# Patient Record
Sex: MALE | Race: WHITE | NOT HISPANIC OR LATINO | Employment: OTHER | URBAN - METROPOLITAN AREA
[De-identification: names, ages, dates, MRNs, and addresses within clinical notes are randomized per-mention and may not be internally consistent; named-entity substitution may affect disease eponyms.]

---

## 2018-12-18 ENCOUNTER — OFFICE VISIT (OUTPATIENT)
Dept: URGENT CARE | Facility: CLINIC | Age: 58
End: 2018-12-18
Payer: COMMERCIAL

## 2018-12-18 VITALS
OXYGEN SATURATION: 96 % | SYSTOLIC BLOOD PRESSURE: 136 MMHG | DIASTOLIC BLOOD PRESSURE: 95 MMHG | BODY MASS INDEX: 26.52 KG/M2 | TEMPERATURE: 98 F | HEIGHT: 66 IN | WEIGHT: 165 LBS | HEART RATE: 86 BPM | RESPIRATION RATE: 20 BRPM

## 2018-12-18 DIAGNOSIS — J20.8 ACUTE BACTERIAL BRONCHITIS: Primary | ICD-10-CM

## 2018-12-18 DIAGNOSIS — B96.89 ACUTE BACTERIAL BRONCHITIS: Primary | ICD-10-CM

## 2018-12-18 PROCEDURE — 3008F BODY MASS INDEX DOCD: CPT | Mod: CPTII,S$GLB,, | Performed by: PHYSICIAN ASSISTANT

## 2018-12-18 PROCEDURE — 96372 THER/PROPH/DIAG INJ SC/IM: CPT | Mod: S$GLB,,, | Performed by: EMERGENCY MEDICINE

## 2018-12-18 PROCEDURE — 99203 OFFICE O/P NEW LOW 30 MIN: CPT | Mod: 25,S$GLB,, | Performed by: PHYSICIAN ASSISTANT

## 2018-12-18 RX ORDER — GLIMEPIRIDE 2 MG/1
TABLET ORAL
Refills: 2 | COMMUNITY
Start: 2018-12-11

## 2018-12-18 RX ORDER — CODEINE PHOSPHATE AND GUAIFENESIN 10; 100 MG/5ML; MG/5ML
5 SOLUTION ORAL 3 TIMES DAILY PRN
Qty: 120 ML | Refills: 0 | Status: SHIPPED | OUTPATIENT
Start: 2018-12-18 | End: 2018-12-28

## 2018-12-18 RX ORDER — CANAGLIFLOZIN 300 MG/1
TABLET, FILM COATED ORAL
Refills: 4 | COMMUNITY
Start: 2018-12-11

## 2018-12-18 RX ORDER — BISOPROLOL FUMARATE AND HYDROCHLOROTHIAZIDE 5; 6.25 MG/1; MG/1
TABLET ORAL
Refills: 2 | COMMUNITY
Start: 2018-12-11

## 2018-12-18 RX ORDER — ALPRAZOLAM 0.5 MG/1
TABLET ORAL
Refills: 0 | COMMUNITY
Start: 2018-10-23

## 2018-12-18 RX ORDER — VALACYCLOVIR HYDROCHLORIDE 1 G/1
TABLET, FILM COATED ORAL
Refills: 0 | COMMUNITY
Start: 2018-11-08

## 2018-12-18 RX ORDER — BETAMETHASONE SODIUM PHOSPHATE AND BETAMETHASONE ACETATE 3; 3 MG/ML; MG/ML
6 INJECTION, SUSPENSION INTRA-ARTICULAR; INTRALESIONAL; INTRAMUSCULAR; SOFT TISSUE
Status: COMPLETED | OUTPATIENT
Start: 2018-12-18 | End: 2018-12-18

## 2018-12-18 RX ORDER — TAMSULOSIN HYDROCHLORIDE 0.4 MG/1
CAPSULE ORAL
Refills: 2 | COMMUNITY
Start: 2018-12-11

## 2018-12-18 RX ORDER — FENOFIBRATE 160 MG/1
TABLET ORAL
Refills: 4 | COMMUNITY
Start: 2018-12-11

## 2018-12-18 RX ORDER — AZITHROMYCIN 250 MG/1
TABLET, FILM COATED ORAL
Qty: 6 TABLET | Refills: 0 | Status: SHIPPED | OUTPATIENT
Start: 2018-12-18

## 2018-12-18 RX ADMIN — BETAMETHASONE SODIUM PHOSPHATE AND BETAMETHASONE ACETATE 6 MG: 3; 3 INJECTION, SUSPENSION INTRA-ARTICULAR; INTRALESIONAL; INTRAMUSCULAR; SOFT TISSUE at 11:12

## 2018-12-18 NOTE — PATIENT INSTRUCTIONS
Bronchitis, Antibiotic Treatment (Adult)    Bronchitis is an infection of the air passages (bronchial tubes) in your lungs. It often occurs when you have a cold. This illness is contagious during the first few days and is spread through the air by coughing and sneezing, or by direct contact (touching the sick person and then touching your own eyes, nose, or mouth).  Symptoms of bronchitis include cough with mucus (phlegm) and low-grade fever. Bronchitis usually lasts 7 to 14 days. Mild cases can be treated with simple home remedies. More severe infection is treated with an antibiotic.  Home care  Follow these guidelines when caring for yourself at home:  · If your symptoms are severe, rest at home for the first 2 to 3 days. When you go back to your usual activities, don't let yourself get too tired.  · Do not smoke. Also avoid being exposed to secondhand smoke.  · You may use over-the-counter medicines to control fever or pain, unless another medicine was prescribed. (Note: If you have chronic liver or kidney disease or have ever had a stomach ulcer or gastrointestinal bleeding, talk with your healthcare provider before using these medicines. Also talk to your provider if you are taking medicine to prevent blood clots.) Aspirin should never be given to anyone younger than 18 years of age who is ill with a viral infection or fever. It may cause severe liver or brain damage.  · Your appetite may be poor, so a light diet is fine. Avoid dehydration by drinking 6 to 8 glasses of fluids per day (such as water, soft drinks, sports drinks, juices, tea, or soup). Extra fluids will help loosen secretions in the nose and lungs.  · Over-the-counter cough, cold, and sore-throat medicines will not shorten the length of the illness, but they may be helpful to reduce symptoms. (Note: Do not use decongestants if you have high blood pressure.)  · Finish all antibiotic medicine. Do this even if you are feeling better after only a  few days.  Follow-up care  Follow up with your healthcare provider, or as advised. If you had an X-ray or ECG (electrocardiogram), a specialist will review it. You will be notified of any new findings that may affect your care.  Note: If you are age 65 or older, or if you have a chronic lung disease or condition that affects your immune system, or you smoke, talk to your healthcare provider about having pneumococcal vaccinations and a yearly influenza vaccination (flu shot).  When to seek medical advice  Call your healthcare provider right away if any of these occur:  · Fever of 100.4°F (38°C) or higher  · Coughing up increased amounts of colored sputum  · Weakness, drowsiness, headache, facial pain, ear pain, or a stiff neck  Call 911, or get immediate medical care  Contact emergency services right away if any of these occur.  · Coughing up blood  · Worsening weakness, drowsiness, headache, or stiff neck  · Trouble breathing, wheezing, or pain with breathing  Date Last Reviewed: 9/13/2015 © 2000-2017 [employer]. [address] [place] [address]. All rights reserved. This information is not intended as a substitute for professional medical care. Always follow your healthcare professional's instructions.      Please follow up with your Primary care provider within 2-5 days if your signs and symptoms have not resolved or worsen.     If your condition worsens or fails to improve we recommend that you receive another evaluation at the emergency room immediately or contact your primary medical clinic to discuss your concerns.   You must understand that you have received an Urgent Care treatment only and that you may be released before all of your medical problems are known or treated. You, the patient, will arrange for follow up care as instructed.     RED FLAGS/WARNING SYMPTOMS DISCUSSED WITH PATIENT THAT WOULD WARRANT EMERGENT MEDICAL ATTENTION. PATIENT VERBALIZED UNDERSTANDING.     Elevated  Blood Pressure  Your blood pressure was elevated during your visit to the urgent care.  It was not so high that immediate care was needed but it is recommended that you monitor your blood pressure over the next week or two to make sure that it is not staying elevated.  Please have your blood pressure taken 2-3 times daily at different times of the day.  Write all of those blood pressures down and record the time that they were taken.  Keep all that information and take it with you to see your Primary Care Physician.  If your blood pressure is consistently above 140/90 you will need to follow up with your PCP more quickly  You were given a steroid injection today. Risks and benefits were discussed with you.   If you have diabetes this injection will raise your blood sugar. This will normalize over the next 2-3 days. Please make sure you monitor you blood sugar accordingly.   This medication can also increase you blood pressure. Please monitor your blood pressure as discussed.   Please keep in mind that you should not take long term steroids unless prescribed by your physician. You should not exceed 4 steroid injections in a year and if you have multiple injections they should be spaced out adequately. Long term side effects and risks can occur as mentioned at todays visit.

## 2018-12-18 NOTE — PROGRESS NOTES
"Subjective:       Patient ID: Arturo Peña is a 58 y.o. male.    Vitals:  height is 5' 6" (1.676 m) and weight is 74.8 kg (165 lb). His oral temperature is 97.8 °F (36.6 °C). His blood pressure is 136/95 (abnormal) and his pulse is 86. His respiration is 20 and oxygen saturation is 96%.     Chief Complaint: Cough    This is a 58 y.o. male who presents today with a chief complaint of URI for 3 weeks.  Pt has sinus pain/pressure, post nasal drip, sore throat, and a cough.  Pt has taken Mucinex, Dayquil, and Nyquil without relief.        Cough   This is a new problem. The current episode started 1 to 4 weeks ago (3 weeks ago). The problem has been waxing and waning. The problem occurs every few minutes. The cough is productive of sputum. Associated symptoms include postnasal drip and a sore throat. Pertinent negatives include no chills, ear pain, eye redness, fever, hemoptysis, myalgias, rash, shortness of breath or wheezing. Treatments tried: Mucinex, Dayquil, Nyquil. The treatment provided no relief.       Constitution: Negative for chills, sweating, fatigue and fever.   HENT: Positive for congestion, postnasal drip, sinus pain, sinus pressure and sore throat. Negative for ear pain and voice change.    Neck: Negative for painful lymph nodes.   Eyes: Negative for eye redness.   Respiratory: Positive for cough. Negative for chest tightness, sputum production, bloody sputum, COPD, shortness of breath, stridor, wheezing and asthma.    Gastrointestinal: Negative for nausea and vomiting.   Musculoskeletal: Negative for muscle ache.   Skin: Negative for rash.   Allergic/Immunologic: Negative for seasonal allergies and asthma.   Hematologic/Lymphatic: Negative for swollen lymph nodes.       Objective:      Physical Exam   Constitutional: He is oriented to person, place, and time. He appears well-developed and well-nourished. He is cooperative.  Non-toxic appearance. He does not appear ill. No distress.   HENT:   Head: " Normocephalic and atraumatic.   Right Ear: Hearing, tympanic membrane, external ear and ear canal normal.   Left Ear: Hearing, tympanic membrane, external ear and ear canal normal.   Nose: Rhinorrhea present. No mucosal edema or nasal deformity. No epistaxis. Right sinus exhibits no maxillary sinus tenderness and no frontal sinus tenderness. Left sinus exhibits no maxillary sinus tenderness and no frontal sinus tenderness.   Mouth/Throat: Uvula is midline and mucous membranes are normal. No trismus in the jaw. Normal dentition. No uvula swelling. Posterior oropharyngeal erythema present. Tonsils are 1+ on the right. Tonsils are 1+ on the left. No tonsillar exudate.   Eyes: Conjunctivae and lids are normal. No scleral icterus.   Sclera clear bilat   Neck: Trachea normal, full passive range of motion without pain and phonation normal. Neck supple.   Cardiovascular: Normal rate, regular rhythm, normal heart sounds, intact distal pulses and normal pulses.   Pulmonary/Chest: Effort normal. No accessory muscle usage or stridor. No respiratory distress. He has no decreased breath sounds. He has no wheezes. He has no rhonchi. He has no rales.   Moderate upper airway conegstion   Abdominal: Soft. Normal appearance and bowel sounds are normal. He exhibits no distension. There is no tenderness.   Musculoskeletal: Normal range of motion. He exhibits no edema or deformity.   Neurological: He is alert and oriented to person, place, and time. He exhibits normal muscle tone. Coordination normal.   Skin: Skin is warm, dry and intact. He is not diaphoretic. No pallor.   Psychiatric: He has a normal mood and affect. His speech is normal and behavior is normal. Judgment and thought content normal. Cognition and memory are normal.   Nursing note and vitals reviewed.      Assessment:       1. Acute bacterial bronchitis        Plan:         Acute bacterial bronchitis  -     betamethasone acetate-betamethasone sodium phosphate injection 6  mg  -     azithromycin (ZITHROMAX Z-LISBETH) 250 MG tablet; Take 2 tablets (500 mg) on  Day 1,  followed by 1 tablet (250 mg) once daily on Days 2 through 5.  Dispense: 6 tablet; Refill: 0  -     guaifenesin-codeine 100-10 mg/5 ml (TUSSI-ORGANIDIN NR)  mg/5 mL syrup; Take 5 mLs by mouth 3 (three) times daily as needed for Cough.  Dispense: 120 mL; Refill: 0          Bronchitis, Antibiotic Treatment (Adult)    Bronchitis is an infection of the air passages (bronchial tubes) in your lungs. It often occurs when you have a cold. This illness is contagious during the first few days and is spread through the air by coughing and sneezing, or by direct contact (touching the sick person and then touching your own eyes, nose, or mouth).  Symptoms of bronchitis include cough with mucus (phlegm) and low-grade fever. Bronchitis usually lasts 7 to 14 days. Mild cases can be treated with simple home remedies. More severe infection is treated with an antibiotic.  Home care  Follow these guidelines when caring for yourself at home:  · If your symptoms are severe, rest at home for the first 2 to 3 days. When you go back to your usual activities, don't let yourself get too tired.  · Do not smoke. Also avoid being exposed to secondhand smoke.  · You may use over-the-counter medicines to control fever or pain, unless another medicine was prescribed. (Note: If you have chronic liver or kidney disease or have ever had a stomach ulcer or gastrointestinal bleeding, talk with your healthcare provider before using these medicines. Also talk to your provider if you are taking medicine to prevent blood clots.) Aspirin should never be given to anyone younger than 18 years of age who is ill with a viral infection or fever. It may cause severe liver or brain damage.  · Your appetite may be poor, so a light diet is fine. Avoid dehydration by drinking 6 to 8 glasses of fluids per day (such as water, soft drinks, sports drinks, juices, tea, or  soup). Extra fluids will help loosen secretions in the nose and lungs.  · Over-the-counter cough, cold, and sore-throat medicines will not shorten the length of the illness, but they may be helpful to reduce symptoms. (Note: Do not use decongestants if you have high blood pressure.)  · Finish all antibiotic medicine. Do this even if you are feeling better after only a few days.  Follow-up care  Follow up with your healthcare provider, or as advised. If you had an X-ray or ECG (electrocardiogram), a specialist will review it. You will be notified of any new findings that may affect your care.  Note: If you are age 65 or older, or if you have a chronic lung disease or condition that affects your immune system, or you smoke, talk to your healthcare provider about having pneumococcal vaccinations and a yearly influenza vaccination (flu shot).  When to seek medical advice  Call your healthcare provider right away if any of these occur:  · Fever of 100.4°F (38°C) or higher  · Coughing up increased amounts of colored sputum  · Weakness, drowsiness, headache, facial pain, ear pain, or a stiff neck  Call 911, or get immediate medical care  Contact emergency services right away if any of these occur.  · Coughing up blood  · Worsening weakness, drowsiness, headache, or stiff neck  · Trouble breathing, wheezing, or pain with breathing  Date Last Reviewed: 9/13/2015  © 0250-0874 [employer]. [address] [place] [address]. All rights reserved. This information is not intended as a substitute for professional medical care. Always follow your healthcare professional's instructions.      Please follow up with your Primary care provider within 2-5 days if your signs and symptoms have not resolved or worsen.     If your condition worsens or fails to improve we recommend that you receive another evaluation at the emergency room immediately or contact your primary medical clinic to discuss your concerns.   You  must understand that you have received an Urgent Care treatment only and that you may be released before all of your medical problems are known or treated. You, the patient, will arrange for follow up care as instructed.     RED FLAGS/WARNING SYMPTOMS DISCUSSED WITH PATIENT THAT WOULD WARRANT EMERGENT MEDICAL ATTENTION. PATIENT VERBALIZED UNDERSTANDING.     Elevated Blood Pressure  Your blood pressure was elevated during your visit to the urgent care.  It was not so high that immediate care was needed but it is recommended that you monitor your blood pressure over the next week or two to make sure that it is not staying elevated.  Please have your blood pressure taken 2-3 times daily at different times of the day.  Write all of those blood pressures down and record the time that they were taken.  Keep all that information and take it with you to see your Primary Care Physician.  If your blood pressure is consistently above 140/90 you will need to follow up with your PCP more quickly  You were given a steroid injection today. Risks and benefits were discussed with you.   If you have diabetes this injection will raise your blood sugar. This will normalize over the next 2-3 days. Please make sure you monitor you blood sugar accordingly.   This medication can also increase you blood pressure. Please monitor your blood pressure as discussed.   Please keep in mind that you should not take long term steroids unless prescribed by your physician. You should not exceed 4 steroid injections in a year and if you have multiple injections they should be spaced out adequately. Long term side effects and risks can occur as mentioned at todays visit.

## 2025-03-21 ENCOUNTER — OFFICE VISIT (OUTPATIENT)
Dept: URGENT CARE | Facility: CLINIC | Age: 65
End: 2025-03-21
Payer: MEDICARE

## 2025-03-21 VITALS
OXYGEN SATURATION: 96 % | WEIGHT: 165 LBS | BODY MASS INDEX: 26.52 KG/M2 | RESPIRATION RATE: 17 BRPM | SYSTOLIC BLOOD PRESSURE: 159 MMHG | DIASTOLIC BLOOD PRESSURE: 87 MMHG | HEIGHT: 66 IN | TEMPERATURE: 98 F | HEART RATE: 70 BPM

## 2025-03-21 DIAGNOSIS — S05.02XA ABRASION OF LEFT CORNEA, INITIAL ENCOUNTER: Primary | ICD-10-CM

## 2025-03-21 RX ORDER — ERYTHROMYCIN 5 MG/G
OINTMENT OPHTHALMIC 4 TIMES DAILY
Qty: 3.5 G | Refills: 0 | Status: SHIPPED | OUTPATIENT
Start: 2025-03-21 | End: 2025-03-28

## 2025-03-21 RX ORDER — EMPAGLIFLOZIN 25 MG/1
25 TABLET, FILM COATED ORAL
COMMUNITY

## 2025-03-21 RX ORDER — SODIUM CHLORIDE 1 G/1
2000 TABLET ORAL 3 TIMES DAILY
COMMUNITY

## 2025-03-21 RX ORDER — SODIUM BICARBONATE 650 MG/1
1300 TABLET ORAL 3 TIMES DAILY
COMMUNITY

## 2025-03-21 RX ORDER — ROSUVASTATIN CALCIUM 10 MG/1
10 TABLET, COATED ORAL NIGHTLY
COMMUNITY
Start: 2025-03-17

## 2025-03-21 RX ORDER — IRBESARTAN 300 MG/1
300 TABLET ORAL
COMMUNITY

## 2025-03-21 RX ORDER — AMLODIPINE BESYLATE 10 MG/1
10 TABLET ORAL NIGHTLY
COMMUNITY

## 2025-03-21 RX ORDER — ERGOCALCIFEROL 1.25 MG/1
50000 CAPSULE ORAL
COMMUNITY

## 2025-03-21 NOTE — PROGRESS NOTES
"Subjective:      Patient ID: Arturo Peña is a 64 y.o. male.    Vitals:  height is 5' 6" (1.676 m) and weight is 74.8 kg (165 lb). His oral temperature is 97.7 °F (36.5 °C). His blood pressure is 159/87 (abnormal) and his pulse is 70. His respiration is 17 and oxygen saturation is 96%.     Chief Complaint: Eye Problem    Patient clinic with a complaint left eye erythema, pain, photophobia, headache, nausea, and drainage that began last night.  He does wear corrective lenses.  He sleeps with his contacts in.  States this morning when he woke up his eye was bright red.  It denies crusted over lashes.  Denies recent upper respiratory illness.  Denies grinding, or possibility of foreign body.  He has used left over antibiotic drops from cataract surgery this morning.  He denies visual acuity status changes.  Unknown last Tdap.    Eye Problem   The left eye is affected. This is a new problem. The current episode started today. The problem has been unchanged. There was no injury mechanism. The pain is at a severity of 2/10. The pain is mild. He Wears contacts. Associated symptoms include eye redness and photophobia. Pertinent negatives include no blurred vision or double vision. He has tried eye drops for the symptoms. The treatment provided no relief.       HENT: Negative.     Eyes:  Positive for foreign body in eye (Sensation), eye pain, eye redness and photophobia. Negative for eye trauma, double vision, blurred vision and eyelid swelling.   Respiratory: Negative.     Allergic/Immunologic: Negative for immunizations up-to-date.   Neurological:  Positive for headaches.      Vision Screening (Inadequate exam)    Right eye Left eye Both eyes   Without correction UTO UTO 20/50   With correction          Objective:     Physical Exam   Constitutional: He is oriented to person, place, and time. He is cooperative.   HENT:   Head: Normocephalic and atraumatic.   Ears:   Right Ear: External ear normal.   Left Ear: External " ear normal.   Nose: Nose normal.   Mouth/Throat: Uvula is midline, oropharynx is clear and moist and mucous membranes are normal. Mucous membranes are moist. Oropharynx is clear.   Eyes: Lids are normal. Pupils are equal, round, and reactive to light. Lids are everted and swept, no foreign bodies found. No visual field deficit is present. Right eye exhibits no discharge. Left eye exhibits no chemosis, no discharge and no exudate. Left conjunctiva is injected. No scleral icterus.   Slit lamp exam:       The left eye shows corneal abrasion and fluorescein uptake.     Extraocular movement intact vision grossly intact gaze aligned appropriately periorbital hyperpigmentation     Comments: Globe tenderness to palpation   Neck: Trachea normal and phonation normal. Neck supple.   Cardiovascular: Normal rate, regular rhythm, normal heart sounds and normal pulses.   Pulmonary/Chest: Effort normal and breath sounds normal.   Abdominal: Normal appearance.   Neurological: no focal deficit. He is alert, oriented to person, place, and time and at baseline. He has normal motor skills, normal sensation and intact cranial nerves (2-12). Gait and coordination normal. GCS eye subscore is 4. GCS verbal subscore is 5. GCS motor subscore is 6.   Skin: Skin is warm and dry. Capillary refill takes 2 to 3 seconds.   Psychiatric: He experiences Normal attention and Normal perception. His speech is normal and behavior is normal. Mood, judgment and thought content normal.   Nursing note and vitals reviewed.      Assessment:     1. Abrasion of left cornea, initial encounter        Plan:       Abrasion of left cornea, initial encounter  -     erythromycin (ROMYCIN) ophthalmic ointment; Place into the left eye 4 (four) times daily. for 7 days  Dispense: 3.5 g; Refill: 0  -     Tdap (BOOSTRIX) vaccine injection 0.5 mL          Medical Decision Making:   History:   Old Medical Records: I decided to obtain old medical records.  Initial Assessment:    History of cataract surgery, wears corrective lenses.  Has not currently wearing corrective lenses, and unable to perform visual acuity screening.  Denies visual acuity status changes.  Does state he has a foreign body sensation, also having I tenderness to touch.  On fluorescein stain exam patient was noted to have a small abrasion at the 5 o'clock position.  I did upload a image.  I did attempt to irrigate to rule out foreign body.  Post irrigation abrasion faintly change and I suspect I flushed out fluorescein.  I have instructed him to follow up with the ophthalmologist.  Updated Tdap as well.  I have given him thorough education to avoid sleeping with contacts in and prolonged wearing of contacts.  Also been educated to avoid use of contacts until following up with ophthalmologist.  Specific ER, return, and follow up instructions were discussed in depth and he is able to verbalized understanding.  Differential Diagnosis:   Foreign body, conjunctivitis, zoster  Clinical Tests:   Medical Tests: Ordered and Reviewed

## 2025-03-21 NOTE — PATIENT INSTRUCTIONS
Follow up with your ophthalmologist within 72 hours.  For any worsening symptoms go to the emergency room.

## 2025-07-07 ENCOUNTER — HOSPITAL ENCOUNTER (EMERGENCY)
Facility: HOSPITAL | Age: 65
Discharge: HOME | End: 2025-07-07
Attending: EMERGENCY MEDICINE
Payer: MEDICARE

## 2025-07-07 VITALS
HEIGHT: 66 IN | SYSTOLIC BLOOD PRESSURE: 132 MMHG | DIASTOLIC BLOOD PRESSURE: 78 MMHG | TEMPERATURE: 98 F | OXYGEN SATURATION: 100 % | HEART RATE: 78 BPM | RESPIRATION RATE: 18 BRPM | BODY MASS INDEX: 24.11 KG/M2 | WEIGHT: 150 LBS

## 2025-07-07 DIAGNOSIS — K59.00 CONSTIPATION, UNSPECIFIED CONSTIPATION TYPE: ICD-10-CM

## 2025-07-07 DIAGNOSIS — M51.9 LUMBAR DISC DISEASE: Primary | ICD-10-CM

## 2025-07-07 DIAGNOSIS — R39.11 URINARY HESITANCY: ICD-10-CM

## 2025-07-07 DIAGNOSIS — M54.32 SCIATICA OF LEFT SIDE: ICD-10-CM

## 2025-07-07 PROCEDURE — 96372 THER/PROPH/DIAG INJ SC/IM: CPT | Performed by: EMERGENCY MEDICINE

## 2025-07-07 PROCEDURE — 63600175 PHARM REV CODE 636 W HCPCS: Performed by: EMERGENCY MEDICINE

## 2025-07-07 PROCEDURE — 99284 EMERGENCY DEPT VISIT MOD MDM: CPT | Mod: 25

## 2025-07-07 RX ORDER — HYDROCODONE BITARTRATE AND ACETAMINOPHEN 7.5; 325 MG/1; MG/1
1 TABLET ORAL EVERY 6 HOURS PRN
Qty: 15 TABLET | Refills: 0 | Status: SHIPPED | OUTPATIENT
Start: 2025-07-07

## 2025-07-07 RX ORDER — SYRING-NEEDL,DISP,INSUL,0.3 ML 29 G X1/2"
296 SYRINGE, EMPTY DISPOSABLE MISCELLANEOUS ONCE
Qty: 296 ML | Refills: 0 | Status: SHIPPED | OUTPATIENT
Start: 2025-07-07 | End: 2025-07-07

## 2025-07-07 RX ORDER — MORPHINE SULFATE 10 MG/ML
7 INJECTION INTRAMUSCULAR; INTRAVENOUS; SUBCUTANEOUS
Refills: 0 | Status: COMPLETED | OUTPATIENT
Start: 2025-07-07 | End: 2025-07-07

## 2025-07-07 RX ORDER — BETAMETHASONE SODIUM PHOSPHATE AND BETAMETHASONE ACETATE 3; 3 MG/ML; MG/ML
6 INJECTION, SUSPENSION INTRA-ARTICULAR; INTRALESIONAL; INTRAMUSCULAR; SOFT TISSUE
Status: COMPLETED | OUTPATIENT
Start: 2025-07-07 | End: 2025-07-07

## 2025-07-07 RX ORDER — TAMSULOSIN HYDROCHLORIDE 0.4 MG/1
0.4 CAPSULE ORAL DAILY
Qty: 30 CAPSULE | Refills: 0 | Status: SHIPPED | OUTPATIENT
Start: 2025-07-07 | End: 2026-07-07

## 2025-07-07 RX ORDER — PREDNISONE 20 MG/1
20 TABLET ORAL 2 TIMES DAILY
Qty: 10 TABLET | Refills: 0 | Status: SHIPPED | OUTPATIENT
Start: 2025-07-07 | End: 2025-07-12

## 2025-07-07 RX ORDER — MORPHINE SULFATE 2 MG/ML
7 INJECTION, SOLUTION INTRAMUSCULAR; INTRAVENOUS
Status: DISCONTINUED | OUTPATIENT
Start: 2025-07-07 | End: 2025-07-07 | Stop reason: SDUPTHER

## 2025-07-07 RX ORDER — ONDANSETRON 8 MG/1
8 TABLET, FILM COATED ORAL EVERY 8 HOURS PRN
Qty: 10 TABLET | Refills: 1 | Status: SHIPPED | OUTPATIENT
Start: 2025-07-07

## 2025-07-07 RX ADMIN — BETAMETHASONE SODIUM PHOSPHATE AND BETAMETHASONE ACETATE 6 MG: 3; 3 INJECTION, SUSPENSION INTRA-ARTICULAR; INTRALESIONAL; INTRAMUSCULAR at 11:07

## 2025-07-07 RX ADMIN — MORPHINE SULFATE 7 MG: 10 INJECTION, SOLUTION INTRAMUSCULAR; INTRAVENOUS at 11:07

## 2025-07-07 NOTE — ED NOTES
Arturo Peña presents to the ED with c/o left hip and leg pain. Patient reports having an ablation done on June 27th. He has been in discomfort since having his ablation but reports the last 2 days have been the worse. He denies any fall or injury.    Mucous membranes are pink and moist. Skin is warm, dry and intact.  TIBURCIO LARA  Verified patient's name and date of birth.

## 2025-07-07 NOTE — ED PROVIDER NOTES
Encounter Date: 7/7/2025       History     Chief Complaint   Patient presents with    Back Pain     Radiating to left leg, had nerve ablation on June 27      Chief complaint: Back pain    HPI: 64-year-old male with a history of lumbar disc disease presents with a one-week history of worsening lumbar pain with radiation to the left lower extremity.  He underwent a nerve ablation on 06/27/2025 and reports that symptoms improved for the 2 days following the procedure; however, pain and numbness have progressively worsened since then.  He denies any fecal urinary incontinence but does have constipation and urinary hesitancy.  He has had no fever, night sweats or chills.  Past medical history is significant for hypertension, hyperlipidemia and diabetes.      Review of patient's allergies indicates:   Allergen Reactions    Sulfa (sulfonamide antibiotics)      Past Medical History:   Diagnosis Date    Diabetes mellitus, type 2     Hyperlipidemia     Hypertension      Past Surgical History:   Procedure Laterality Date    SHOULDER SURGERY       Family History   Problem Relation Name Age of Onset    Diabetes Mother      Hypertension Mother      Heart failure Father       Social History[1]  Review of Systems   Constitutional:  Negative for activity change, appetite change and fever.   HENT:  Negative for voice change.    Eyes:  Negative for visual disturbance.   Respiratory:  Negative for apnea and shortness of breath.    Cardiovascular:  Negative for chest pain.   Gastrointestinal:  Negative for abdominal pain and vomiting.   Genitourinary:  Negative for decreased urine volume.   Musculoskeletal:  Positive for back pain and myalgias. Negative for neck pain.   Skin:  Negative for color change.   Neurological:  Positive for weakness and numbness. Negative for headaches.   Hematological:  Does not bruise/bleed easily.   Psychiatric/Behavioral:  Negative for confusion.        Physical Exam     Initial Vitals [07/07/25 1107]   BP  Pulse Resp Temp SpO2   118/74 89 20 97.9 °F (36.6 °C) 97 %      MAP       --         Physical Exam    Nursing note and vitals reviewed.  Constitutional: Vital signs are normal. He appears well-developed and well-nourished.   HENT:   Head: Normocephalic and atraumatic.   Eyes: Conjunctivae are normal.   Neck: Trachea normal and phonation normal.   Cardiovascular:  Normal rate and regular rhythm.           Abdominal: Abdomen is soft. He exhibits no distension. There is no abdominal tenderness.   Musculoskeletal:         General: Tenderness (lumbar spinous and paraspinous tenderness) present.     Neurological: He is alert and oriented to person, place, and time. He has normal strength. A sensory deficit (decreased light touch sensation of the left lateral leg) is present. GCS score is 15. GCS eye subscore is 4. GCS verbal subscore is 5. GCS motor subscore is 6.   Skin: Skin is warm and dry.   Psychiatric: He has a normal mood and affect. His speech is normal.         ED Course   Procedures  Labs Reviewed   HEPATITIS C ANTIBODY   HEP C VIRUS HOLD SPECIMEN   HIV 1 / 2 ANTIBODY   HIV VIRUS CONFIRMATION HOLD SPECIMEN          Imaging Results              X-Ray Lumbar Spine Ap And Lateral (Final result)  Result time 07/07/25 11:53:03      Final result by Delroy Reynolds MD (07/07/25 11:53:03)                   Impression:      1. Advanced degenerative changes but no radiographic evidence of an acute bony process.      Electronically signed by: Delroy Reynolds  Date:    07/07/2025  Time:    11:53               Narrative:    EXAMINATION:  XR LUMBAR SPINE AP AND LATERAL    CLINICAL HISTORY:  back pain;    TECHNIQUE:  AP, lateral and spot images were performed of the lumbar spine.    COMPARISON:  None    FINDINGS:  Mild broad levocurvature.  Straightening of the expected normal lumbar lordosis and grade 1 retrolisthesis of L5 on S1 as well as multilevel endplate centered osteophytes in the setting of moderate/severe degenerative disc  height loss throughout most pronounced at L4-L5 and L5-S1.  There appears to be lumbosacral transitional anatomy with partial lumbarization of S1 with rudimentary disc and superimposed degenerative changes with disc height loss.  Lower lumbar predominant facet arthrosis.  Atherosclerotic changes are noted.                                       Medications   betamethasone acetate-betamethasone sodium phosphate injection 6 mg (6 mg Intramuscular Given 7/7/25 1137)   morphine injection 7 mg (7 mg Intramuscular Given 7/7/25 1134)     Medical Decision Making  64-year-old male with known degenerative disc disease status post nerve ablation presents with a one-week history of worsening left-sided back pain with radiation to the lower extremity.  He has objective numbness but no weakness.  X-rays reveal advanced widespread degenerative disc disease.  He will be started on a 5 day course of prednisone in his encouraged to return for weakness or urinary/fecal incontinence.  He describes urinary hesitancy with BPH most likely.  He is started on Flomax.  He also reports constipation in his given a prescription for magnesium citrate.  I discussed the case with Dr. Ribeiro who will see him in 2 days.    Amount and/or Complexity of Data Reviewed  Radiology: ordered.    Risk  OTC drugs.  Prescription drug management.                                      Clinical Impression:  Final diagnoses:  [M51.9] Lumbar disc disease (Primary)  [M54.32] Sciatica of left side  [K59.00] Constipation, unspecified constipation type  [R39.11] Urinary hesitancy          ED Disposition Condition    Discharge           ED Prescriptions       Medication Sig Dispense Start Date End Date Auth. Provider    predniSONE (DELTASONE) 20 MG tablet Take 1 tablet (20 mg total) by mouth 2 (two) times daily. for 5 days 10 tablet 7/7/2025 7/12/2025 Victor Hugo Green III, MD    magnesium citrate solution (Expires today) Take 296 mLs by mouth once. for 1 dose 296 mL  7/7/2025 7/7/2025 Victor Hugo Green III, MD    HYDROcodone-acetaminophen (NORCO) 7.5-325 mg per tablet Take 1 tablet by mouth every 6 (six) hours as needed for Pain. 15 tablet 7/7/2025 -- Victor Hugo Green III, MD    tamsulosin (FLOMAX) 0.4 mg Cap Take 1 capsule (0.4 mg total) by mouth once daily. 30 capsule 7/7/2025 7/7/2026 Victor Hugo Green III, MD    ondansetron (ZOFRAN) 8 MG tablet Take 1 tablet (8 mg total) by mouth every 8 (eight) hours as needed for Nausea. 10 tablet 7/7/2025 -- Victor Hugo Green III, MD          Follow-up Information       Follow up With Specialties Details Why Contact Info    Elroy Ribeiro MD Physical Medicine and Rehabilitation In 2 days  [address]  [place] [address]  [phone]      Parth Goldstein,  Neurosurgery In 3 days  [address] [place] [address] [address]  [place] [address]  [phone]                     [1]   Social History  Tobacco Use    Smoking status: Former   Substance Use Topics    Alcohol use: Yes     Comment: Occasionally    Drug use: Yes     Types: Marijuana        Victor Hugo Green III, MD  07/07/25 [id number]

## 2025-07-07 NOTE — ED NOTES
Upon discharge, patient is AAOx4, no cardiac or respiratory complications. Follow up care and  Medications have been reviewed with patient and has been instructed to return to the ER if needed. Patient verbalized understanding and ambulated to the lobby without difficulty. JANE DEY.

## 2025-07-07 NOTE — ED NOTES
Patient will be medicated after pharmacy changed the concentration to the 10 mg vial for less IM injection volume.

## 2025-07-15 ENCOUNTER — HOSPITAL ENCOUNTER (EMERGENCY)
Facility: HOSPITAL | Age: 65
Discharge: HOME | End: 2025-07-15
Attending: EMERGENCY MEDICINE
Payer: MEDICARE

## 2025-07-15 VITALS
WEIGHT: 150 LBS | HEART RATE: 84 BPM | OXYGEN SATURATION: 96 % | DIASTOLIC BLOOD PRESSURE: 90 MMHG | SYSTOLIC BLOOD PRESSURE: 136 MMHG | RESPIRATION RATE: 16 BRPM | TEMPERATURE: 98 F | BODY MASS INDEX: 24.11 KG/M2 | HEIGHT: 66 IN

## 2025-07-15 DIAGNOSIS — M48.061 SPINAL STENOSIS OF LUMBAR REGION AT MULTIPLE LEVELS: ICD-10-CM

## 2025-07-15 DIAGNOSIS — M51.362 DEGENERATION OF INTERVERTEBRAL DISC OF LUMBAR REGION WITH DISCOGENIC BACK PAIN AND LOWER EXTREMITY PAIN: ICD-10-CM

## 2025-07-15 DIAGNOSIS — E11.65 TYPE 2 DIABETES MELLITUS WITH HYPERGLYCEMIA, WITHOUT LONG-TERM CURRENT USE OF INSULIN: ICD-10-CM

## 2025-07-15 DIAGNOSIS — I10 HYPERTENSION, UNSPECIFIED TYPE: ICD-10-CM

## 2025-07-15 DIAGNOSIS — M51.9 LUMBAR DISC DISEASE: ICD-10-CM

## 2025-07-15 DIAGNOSIS — N18.9 CHRONIC KIDNEY DISEASE, UNSPECIFIED CKD STAGE: ICD-10-CM

## 2025-07-15 DIAGNOSIS — M54.32 SCIATICA OF LEFT SIDE: Primary | ICD-10-CM

## 2025-07-15 LAB
ABSOLUTE EOSINOPHIL (SMH): 0 K/UL
ABSOLUTE MONOCYTE (SMH): 0.44 K/UL (ref 0.3–1)
ABSOLUTE NEUTROPHIL COUNT (SMH): 12 K/UL (ref 1.8–7.7)
ALBUMIN SERPL-MCNC: 3.3 G/DL (ref 3.5–5.2)
ALP SERPL-CCNC: 72 UNIT/L (ref 40–150)
ALT SERPL-CCNC: 28 UNIT/L (ref 10–44)
ANION GAP (SMH): 10 MMOL/L (ref 8–16)
AST SERPL-CCNC: 19 UNIT/L (ref 11–45)
BASOPHILS # BLD AUTO: 0.01 K/UL
BASOPHILS NFR BLD AUTO: 0.1 %
BILIRUB SERPL-MCNC: 0.6 MG/DL (ref 0.1–1)
BILIRUB UR QL STRIP.AUTO: NEGATIVE
BUN SERPL-MCNC: 41 MG/DL (ref 8–23)
CALCIUM SERPL-MCNC: 9.6 MG/DL (ref 8.7–10.5)
CHLORIDE SERPL-SCNC: 98 MMOL/L (ref 95–110)
CLARITY UR: CLEAR
CO2 SERPL-SCNC: 22 MMOL/L (ref 23–29)
COLOR UR AUTO: YELLOW
CREAT SERPL-MCNC: 1.7 MG/DL (ref 0.5–1.4)
ERYTHROCYTE [DISTWIDTH] IN BLOOD BY AUTOMATED COUNT: 12.2 % (ref 11.5–14.5)
GFR SERPLBLD CREATININE-BSD FMLA CKD-EPI: 44 ML/MIN/1.73/M2
GLUCOSE SERPL-MCNC: 315 MG/DL (ref 70–110)
GLUCOSE UR QL STRIP: ABNORMAL
HCT VFR BLD AUTO: 47.5 % (ref 40–54)
HGB BLD-MCNC: 16.3 GM/DL (ref 14–18)
HGB UR QL STRIP: NEGATIVE
IMM GRANULOCYTES # BLD AUTO: 0.07 K/UL (ref 0–0.04)
IMM GRANULOCYTES NFR BLD AUTO: 0.5 % (ref 0–0.5)
KETONES UR QL STRIP: NEGATIVE
LEUKOCYTE ESTERASE UR QL STRIP: NEGATIVE
LYMPHOCYTES # BLD AUTO: 0.92 K/UL (ref 1–4.8)
MCH RBC QN AUTO: 31 PG (ref 27–31)
MCHC RBC AUTO-ENTMCNC: 34.3 G/DL (ref 32–36)
MCV RBC AUTO: 90 FL (ref 82–98)
MICROSCOPIC COMMENT: NORMAL
NITRITE UR QL STRIP: NEGATIVE
NUCLEATED RBC (/100WBC) (SMH): 0 /100 WBC
PH UR STRIP: 7 [PH]
PLATELET # BLD AUTO: 261 K/UL (ref 150–450)
PMV BLD AUTO: 9.5 FL (ref 9.2–12.9)
POTASSIUM SERPL-SCNC: 5 MMOL/L (ref 3.5–5.1)
PROT SERPL-MCNC: 7.1 GM/DL (ref 6–8.4)
PROT UR QL STRIP: ABNORMAL
RBC # BLD AUTO: 5.26 M/UL (ref 4.6–6.2)
RBC #/AREA URNS AUTO: 1 /HPF
RELATIVE EOSINOPHIL (SMH): 0 % (ref 0–8)
RELATIVE LYMPHOCYTE (SMH): 6.9 % (ref 18–48)
RELATIVE MONOCYTE (SMH): 3.3 % (ref 4–15)
RELATIVE NEUTROPHIL (SMH): 89.2 % (ref 38–73)
SODIUM SERPL-SCNC: 130 MMOL/L (ref 136–145)
SP GR UR STRIP: 1.02
UROBILINOGEN UR STRIP-ACNC: NEGATIVE EU/DL
WBC # BLD AUTO: 13.41 K/UL (ref 3.9–12.7)
WBC #/AREA URNS AUTO: 1 /HPF

## 2025-07-15 PROCEDURE — 36415 COLL VENOUS BLD VENIPUNCTURE: CPT | Performed by: EMERGENCY MEDICINE

## 2025-07-15 PROCEDURE — 96375 TX/PRO/DX INJ NEW DRUG ADDON: CPT

## 2025-07-15 PROCEDURE — 96374 THER/PROPH/DIAG INJ IV PUSH: CPT

## 2025-07-15 PROCEDURE — 81003 URINALYSIS AUTO W/O SCOPE: CPT | Performed by: NURSE PRACTITIONER

## 2025-07-15 PROCEDURE — 82374 ASSAY BLOOD CARBON DIOXIDE: CPT | Performed by: EMERGENCY MEDICINE

## 2025-07-15 PROCEDURE — 85025 COMPLETE CBC W/AUTO DIFF WBC: CPT | Performed by: EMERGENCY MEDICINE

## 2025-07-15 PROCEDURE — 99285 EMERGENCY DEPT VISIT HI MDM: CPT | Mod: 25

## 2025-07-15 PROCEDURE — 63600175 PHARM REV CODE 636 W HCPCS: Performed by: EMERGENCY MEDICINE

## 2025-07-15 RX ORDER — MORPHINE SULFATE 4 MG/ML
4 INJECTION, SOLUTION INTRAMUSCULAR; INTRAVENOUS
Refills: 0 | Status: COMPLETED | OUTPATIENT
Start: 2025-07-15 | End: 2025-07-15

## 2025-07-15 RX ORDER — ONDANSETRON HYDROCHLORIDE 2 MG/ML
4 INJECTION, SOLUTION INTRAVENOUS
Status: COMPLETED | OUTPATIENT
Start: 2025-07-15 | End: 2025-07-15

## 2025-07-15 RX ORDER — HYDROCODONE BITARTRATE AND ACETAMINOPHEN 7.5; 325 MG/1; MG/1
1 TABLET ORAL EVERY 4 HOURS PRN
Qty: 18 TABLET | Refills: 0 | Status: SHIPPED | OUTPATIENT
Start: 2025-07-15 | End: 2025-07-18

## 2025-07-15 RX ADMIN — ONDANSETRON 4 MG: 2 INJECTION INTRAMUSCULAR; INTRAVENOUS at 03:07

## 2025-07-15 RX ADMIN — MORPHINE SULFATE 4 MG: 4 INJECTION INTRAVENOUS at 03:07

## 2025-07-15 NOTE — ED PROVIDER NOTES
Encounter Date: 7/15/2025       History     Chief Complaint   Patient presents with    Back Pain     Chronic back pain now with loss of bladder control.  States getting an epidural tomorrow for his back       Emergent eval of a 64-year-old male with a history of lumbar disc disease and severe spinal stenosis with left-sided sciatica as well as bladder issues with intermittent bladder leakage presents to the ER due to ongoing severe low back pain particularly in the left radiating down the leg to level of the knee.  But no worsening of back pain since being seen in the ER on the 7th at that time he was prescribed steroids and Norco 7.5 he reports that has ran out of the pain medication he is planning on having Dr. Ribeiro due epidural injection tomorrow but last night developed urinary incontinence on himself 4 times while in bed.  He reports when he is up and moving around during the day he is able to get to the restroom without difficulty and can control his bladder.  That has had no bowel incontinence or retention.  But that has night he was having urinary leakage he was unsure if this was bladder related or due to worsening of his back problems and that has told to come to the ER.  He reports no perianal numbness or tingling.  No change in his pain numbness tingling or weakness in the lower extremities he is able to ambulate with a cane   He has had no fever, night sweats or chills.  Past medical history is significant for hypertension, hyperlipidemia and diabetes.        Review of patient's allergies indicates:   Allergen Reactions    Sulfa (sulfonamide antibiotics)      Past Medical History:   Diagnosis Date    Diabetes mellitus, type 2     Hyperlipidemia     Hypertension      Past Surgical History:   Procedure Laterality Date    SHOULDER SURGERY       Family History   Problem Relation Name Age of Onset    Diabetes Mother      Hypertension Mother      Heart failure Father       Social History[1]  Review of  Systems   Constitutional:  Negative for activity change, appetite change, chills, diaphoresis, fatigue and fever.   Respiratory:  Negative for cough, chest tightness, shortness of breath and wheezing.    Cardiovascular:  Negative for chest pain and palpitations.   Gastrointestinal:  Negative for abdominal pain, constipation, diarrhea, nausea and vomiting.   Genitourinary:  Positive for enuresis. Negative for decreased urine volume, difficulty urinating, dysuria, frequency, hematuria and urgency.        Leakage of urine at night   Musculoskeletal:  Positive for back pain and myalgias. Negative for neck pain and neck stiffness.   Neurological:  Negative for dizziness, weakness, light-headedness, numbness and headaches.   All other systems reviewed and are negative.      Physical Exam     Initial Vitals [07/15/25 1111]   BP Pulse Resp Temp SpO2   (!) 152/93 100 20 97.5 °F (36.4 °C) 96 %      MAP       --         Physical Exam    Nursing note and vitals reviewed.  Constitutional: He appears well-developed and well-nourished. He is not diaphoretic. No distress.   HENT:   Head: Normocephalic and atraumatic.   Right Ear: External ear normal.   Left Ear: External ear normal.   Nose: Nose normal. Mouth/Throat: Oropharynx is clear and moist.   Eyes: Conjunctivae and EOM are normal. Pupils are equal, round, and reactive to light.   Neck: Neck supple. No tracheal deviation present.   Normal range of motion.  Cardiovascular:  Normal rate, regular rhythm, normal heart sounds and intact distal pulses.     Exam reveals no gallop and no friction rub.       No murmur heard.  Pulmonary/Chest: Breath sounds normal. No stridor. No respiratory distress. He has no wheezes. He has no rhonchi. He has no rales. He exhibits no tenderness.   Abdominal: Abdomen is soft. Bowel sounds are normal. He exhibits no distension and no mass. There is no abdominal tenderness. There is no rebound and no guarding.   Musculoskeletal:         General:  Tenderness present. No edema. Normal range of motion.      Cervical back: Normal range of motion and neck supple.      Comments: no tenderness over midline spine cervical thoracic lumbar sacral tenderness to left sciatic nerve root region.  And down the left leg with positive left straight leg raise.  5/5 stengt hbilat LE and patient reports slightly decreased sensation to bilateral lower extremities equally to the level of the thighs.      Neurological: He is alert and oriented to person, place, and time. He has normal strength. No cranial nerve deficit or sensory deficit.   Skin: Skin is warm and dry. No rash noted. No erythema. No pallor.   Psychiatric: He has a normal mood and affect. His behavior is normal. Judgment and thought content normal.         ED Course   Procedures  Labs Reviewed   URINALYSIS, REFLEX TO URINE CULTURE - Abnormal       Result Value    Color, UA Yellow      Appearance, UA Clear      Spec Grav UA 1.020      pH, UA 7.0      Protein, UA 2+ (*)     Glucose, UA 4+ (*)     Ketones, UA Negative      Blood, UA Negative      Bilirubin, UA Negative      Urobilinogen, UA Negative      Nitrites, UA Negative      Leukocyte Esterase, UA Negative     COMPREHENSIVE METABOLIC PANEL - Abnormal    Sodium 130 (*)     Potassium 5.0      Chloride 98      CO2 22 (*)     Glucose 315 (*)     BUN 41 (*)     Creatinine 1.7 (*)     Calcium 9.6      Protein Total 7.1      Albumin 3.3 (*)     Bilirubin Total 0.6      ALP 72      AST 19      ALT 28      Anion Gap 10      eGFR 44 (*)    CBC WITH DIFFERENTIAL - Abnormal    WBC 13.41 (*)     RBC 5.26      Hgb 16.3      Hct 47.5      MCV 90      MCH 31.0      MCHC 34.3      RDW 12.2      Platelet Count 261      MPV 9.5      Nucleated RBC 0      Neut % 89.2 (*)     Lymph % 6.9 (*)     Mono % 3.3 (*)     Eos % 0.0      Basophil % 0.1      Imm Grans % 0.5      Neut # 12.0 (*)     Lymph # 0.92 (*)     Mono # 0.44      Eos # 0.00      Baso # 0.01      Imm Grans # 0.07 (*)     URINALYSIS MICROSCOPIC    RBC, UA 1      WBC, UA 1      Microscopic Comment       CBC W/ AUTO DIFFERENTIAL    Narrative:     The following orders were created for panel order Complete Blood Count (CBC).  Procedure                               Abnormality         Status                     ---------                               -----------         ------                     CBC with Differential[[id number]]       Abnormal            Final result                 Please view results for these tests on the individual orders.          Imaging Results              MRI Lumbar Spine Without Contrast (Final result)  Result time 07/15/25 15:31:11      Final result by Ras Roth MD (07/15/25 15:31:11)                   Impression:      1. There is extensive multilevel degenerative disc and facet disease present in the setting of a spinal canal which is small on a developmental basis resulting in severe multilevel spinal canal, lateral recess and/or foraminal stenosis.  There is no fracture.  These findings are discussed in detail by level above.      Electronically signed by: Ras Roth MD  Date:    07/15/2025  Time:    15:31               Narrative:    EXAMINATION:  MRI LUMBAR SPINE WITHOUT CONTRAST    CLINICAL HISTORY:  Low back pain, cauda equina syndrome suspected;    TECHNIQUE:  Multiplanar, multisequence MR images were acquired from the thoracolumbar junction to the sacrum without the administration of contrast.    COMPARISON:  Plain films of the lumbar spine dated 07/07/2025    FINDINGS:  Vertebral column: There is extensive multilevel degenerative change.  The vertebral bodies maintain normal height.  There is no fracture.  There is severe disc space narrowing at the L2-3 through L5-S1 levels with moderate to severe disc space narrowing at T10-11 through L1-2.  There is multilevel endplate osteophyte formation.  There is mixed Modic type 1 and 2 endplate change at the L2-3 through L5-S1 levels.   The discs are desiccated.  Baseline marrow signal is normal.  There is mild retrolisthesis of L5 on S1.    Spinal canal, conus, epidural space: The spinal canal is small on a developmental basis.  The conus terminates at the level of T12 and is normal in contour and signal intensity.    Findings by level:    On the sagittal images there is moderate to marked disc space narrowing at the T10-11 level.  There is a broad disc protrusion.  There is mild-to-moderate spinal stenosis without cord compression.  There is at least mild foraminal stenosis, incompletely evaluated without axial images.    T11-12: There is moderate to marked disc space narrowing.  There is a broad disc protrusion/osteophyte eccentric to the left.  There is mild facet joint arthropathy.  There is only borderline to mild spinal stenosis with mild crowding of the left lateral recess.  There is mild-to-moderate bilateral foraminal stenosis.    T12-L1: There is moderate to marked disc space narrowing.  There is a small left paracentral disc protrusion.  There is mild bilateral foraminal stenosis without significant spinal stenosis.    L1-2: There is moderate to marked disc space narrowing.  There is a moderate diffuse disc bulge with osteophytic ridging and small superimposed central disc extrusion with minimal caudad extension.  Additionally there is facet joint arthropathy with ligamentum flavum thickening.  There is at least moderate spinal stenosis.  There is crowding of the left lateral recess.  There is moderate left, mild right foraminal stenosis.    L2-3: There is severe disc space narrowing.  There is a diffuse disc bulge with osteophytic ridging and right paracentral disc extrusion with caudad extension.  There is severe spinal canal, right greater than left lateral recess and foraminal stenosis.    L3-4: There is severe disc space narrowing.  There is a diffuse disc bulge with superimposed central and right paracentral moderate disc  protrusion in addition to bilateral facet joint arthropathy resulting in severe right lateral recess and foraminal stenosis, moderate left foraminal stenosis with moderate to severe spinal stenosis.    L4-5: There is severe disc space narrowing.  There is a moderate diffuse disc bulge with superimposed central and right paracentral focal disc extrusion with osteophytic ridging in addition to facet joint arthropathy resulting in severe spinal canal, bilateral lateral recess and foraminal stenosis    L5-S1: There is mild retrolisthesis of L5 on S1.  There is severe disc space narrowing.  There is bilateral facet joint arthropathy.  There is a disc bulge with osteophytic ridging and superimposed central disc extrusion which contacts the ventral dural sac and left S1 root.  There is moderate spinal stenosis, crowding of the left lateral recess in moderate bilateral foraminal stenosis.    Soft tissues, other: The prevertebral soft tissues are grossly normal.  The aorta is normal in caliber.  There are multiple left renal cysts.  There is no hydronephrosis.                                       Medications   morphine injection 4 mg (4 mg Intravenous Given 7/15/25 1548)   ondansetron injection 4 mg (4 mg Intravenous Given 7/15/25 1547)     Medical Decision Making    Emergent eval of a 64-year-old male with a history of lumbar disc disease and severe spinal stenosis with left-sided sciatica as well as bladder issues with intermittent bladder leakage presents to the ER due to ongoing severe low back pain particularly in the left radiating down the leg to level of the knee.  But no worsening of back pain since being seen in the ER on the 7th at that time he was prescribed steroids and Norco 7.5 he reports that has ran out of the pain medication he is planning on having Dr. Ribeiro due epidural injection tomorrow but last night developed urinary incontinence on himself 4 times while in bed.  He reports when he is up and moving  around during the day he is able to get to the restroom without difficulty and can control his bladder.  That has had no bowel incontinence or retention.  But that has night he was having urinary leakage he was unsure if this was bladder related or due to worsening of his back problems and that has told to come to the ER.  He reports no perianal numbness or tingling.  No change in his pain numbness tingling or weakness in the lower extremities he is able to ambulate with a cane   He has had no fever, night sweats or chills.  Past medical history is significant for hypertension, hyperlipidemia and diabetes.  On physical exam blood pressure 152/93 pulse 102 respirations 20 sats 96% temperature 97.5° no tenderness over midline spine cervical thoracic lumbar sacral tenderness to left sciatic nerve root region.  And down the left leg with positive left straight leg raise.  5/5 stengt hbilat LE and patient reports slightly decreased sensation to bilateral lower extremities equally to the level of the thighs.   MDM    Patient presents for emergent evaluation of acute low back pain radiating down left leg history of spinal stenosis and disc disease that poses a threat to life and/or bodily function.   Differential diagnosis includes but was not limited to cauda equina, epidural abscess or hematoma, worsening spinal stenosis, lumbar radiculopathy, UTI, pyelo, kidney stones.   In the ED patient found to have acute [M54.32] Sciatica of left side (Primary)  [M48.061] Spinal stenosis of lumbar region at multiple levels  [M51.362] Degeneration of intervertebral disc of lumbar region with discogenic back pain and lower extremity pain  [I10] Hypertension, unspecified type  [N18.9] Chronic kidney disease, unspecified CKD stage  [E11.65] Type 2 diabetes mellitus with hyperglycemia, without long-term current use of insulin    I ordered labs and personally reviewed them.  Labs significant for see below  I ordered X-rays and personally  reviewed them and reviewed the radiologist interpretation.  Xray significant for see above.    I ordered EKG and personally reviewed it.  EKG significant for see above.    I ordered MRI scan and personally reviewed it and reviewed the radiologist interpretation.  MRI  significant for FINDINGS:   Vertebral column: There is extensive multilevel degenerative change.  The vertebral bodies maintain normal height.  There is no fracture.  There is severe disc space narrowing at the L2-3 through L5-S1 levels with moderate to severe disc space narrowing at T10-11 through L1-2.  There is multilevel endplate osteophyte formation.  There is mixed Modic type 1 and 2 endplate change at the L2-3 through L5-S1 levels.  The discs are desiccated.  Baseline marrow signal is normal.  There is mild retrolisthesis of L5 on S1.     Spinal canal, conus, epidural space: The spinal canal is small on a developmental basis.  The conus terminates at the level of T12 and is normal in contour and signal intensity.     Findings by level:     On the sagittal images there is moderate to marked disc space narrowing at the T10-11 level.  There is a broad disc protrusion.  There is mild-to-moderate spinal stenosis without cord compression.  There is at least mild foraminal stenosis, incompletely evaluated without axial images.     T11-12: There is moderate to marked disc space narrowing.  There is a broad disc protrusion/osteophyte eccentric to the left.  There is mild facet joint arthropathy.  There is only borderline to mild spinal stenosis with mild crowding of the left lateral recess.  There is mild-to-moderate bilateral foraminal stenosis.     T12-L1: There is moderate to marked disc space narrowing.  There is a small left paracentral disc protrusion.  There is mild bilateral foraminal stenosis without significant spinal stenosis.     L1-2: There is moderate to marked disc space narrowing.  There is a moderate diffuse disc bulge with osteophytic  ridging and small superimposed central disc extrusion with minimal caudad extension.  Additionally there is facet joint arthropathy with ligamentum flavum thickening.  There is at least moderate spinal stenosis.  There is crowding of the left lateral recess.  There is moderate left, mild right foraminal stenosis.     L2-3: There is severe disc space narrowing.  There is a diffuse disc bulge with osteophytic ridging and right paracentral disc extrusion with caudad extension.  There is severe spinal canal, right greater than left lateral recess and foraminal stenosis.     L3-4: There is severe disc space narrowing.  There is a diffuse disc bulge with superimposed central and right paracentral moderate disc protrusion in addition to bilateral facet joint arthropathy resulting in severe right lateral recess and foraminal stenosis, moderate left foraminal stenosis with moderate to severe spinal stenosis.     L4-5: There is severe disc space narrowing.  There is a moderate diffuse disc bulge with superimposed central and right paracentral focal disc extrusion with osteophytic ridging in addition to facet joint arthropathy resulting in severe spinal canal, bilateral lateral recess and foraminal stenosis     L5-S1: There is mild retrolisthesis of L5 on S1.  There is severe disc space narrowing.  There is bilateral facet joint arthropathy.  There is a disc bulge with osteophytic ridging and superimposed central disc extrusion which contacts the ventral dural sac and left S1 root.  There is moderate spinal stenosis, crowding of the left lateral recess in moderate bilateral foraminal stenosis.     Soft tissues, other: The prevertebral soft tissues are grossly normal.  The aorta is normal in caliber.  There are multiple left renal cysts.  There is no hydronephrosis.       Discharge MDM     Patient was managed in the ED with IV 4 mg morphine 4 mg of Zofran pain mildly improved patient is able to ambulate to the restroom and back  was able to control his bladder and did not have urinary leakage here .  Rectal exam with normal perianal sensation normal rectal tone.  MRI revealed severe spinal stenosis and multiple levels and severe disc disease but no signs of cauda equina.  Patient be discharged to follow up with PM and R physician tomorrow for epidural and Dr. Goldstein for surgery as planned I have given him Norco 7.5 mg  The response to treatment was good.    Patient was discharged in stable condition.  Detailed return precautions discussed.  Patient was told to follow up with primary care physician or specialist based on their diagnosis  Sandhya Grimaldo MD      Amount and/or Complexity of Data Reviewed  Labs: ordered. Decision-making details documented in ED Course.  Radiology: ordered.    Risk  Prescription drug management.               ED Course as of 07/15/25 1727   Tue Jul 15, 2025   1452 Urine with 2+ protein 4+ glucose otherwise normal [RM]   1640 CBC white count of 13.4 otherwise normal  CMP sodium 130 bicarb 22 glucose 315 BUN 41 creatinine 1.7 [RM]      ED Course User Index  [RM] Sandhya Grimaldo MD                               Clinical Impression:  Final diagnoses:  [M54.32] Sciatica of left side (Primary)  [M48.061] Spinal stenosis of lumbar region at multiple levels  [M51.362] Degeneration of intervertebral disc of lumbar region with discogenic back pain and lower extremity pain  [I10] Hypertension, unspecified type  [N18.9] Chronic kidney disease, unspecified CKD stage  [E11.65] Type 2 diabetes mellitus with hyperglycemia, without long-term current use of insulin          ED Disposition Condition    Discharge Stable          ED Prescriptions       Medication Sig Dispense Start Date End Date Auth. Provider    HYDROcodone-acetaminophen (NORCO) 7.5-325 mg per tablet Take 1 tablet by mouth every 4 (four) hours as needed for Pain. 18 tablet 7/15/2025 7/18/2025 Sandhya Grimaldo MD          Follow-up Information       Follow  up With Specialties Details Why Contact Info Additional Information    Elroy Ribeiro MD Physical Medicine and Rehabilitation  Tomorrow as scheduled for epidural injection [address]  [address]  [place] [address]  [phone]       Parth Goldstein, DO Neurosurgery Schedule an appointment as soon as possible for a visit   [address] [place] [address] [address]   [place] [address]  [phone]       Aren Lopez Jr., MD Internal Medicine Schedule an appointment as soon as possible for a visit  For ongoing pain control [address]  [address]  [place] [address]  [phone]       [place] [facility] Emergency Medicine Go to  If symptoms worsen [address] [place] [address] [address] [place] [address] 1st floor                     [1]   Social History  Tobacco Use    Smoking status: Former   Substance Use Topics    Alcohol use: Yes     Comment: Occasionally    Drug use: Yes     Types: Marijuana        Sandhya Grimaldo MD  07/15/25 [id number]